# Patient Record
Sex: FEMALE
[De-identification: names, ages, dates, MRNs, and addresses within clinical notes are randomized per-mention and may not be internally consistent; named-entity substitution may affect disease eponyms.]

---

## 2020-01-04 ENCOUNTER — HOSPITAL ENCOUNTER (EMERGENCY)
Dept: HOSPITAL 92 - ERS | Age: 80
Discharge: HOME | End: 2020-01-04
Payer: MEDICARE

## 2020-01-04 DIAGNOSIS — Z79.899: ICD-10-CM

## 2020-01-04 DIAGNOSIS — N39.0: Primary | ICD-10-CM

## 2020-01-04 DIAGNOSIS — R05: ICD-10-CM

## 2020-01-04 PROCEDURE — 87086 URINE CULTURE/COLONY COUNT: CPT

## 2020-01-04 PROCEDURE — 87186 SC STD MICRODIL/AGAR DIL: CPT

## 2020-01-04 PROCEDURE — 81003 URINALYSIS AUTO W/O SCOPE: CPT

## 2020-01-04 PROCEDURE — 87077 CULTURE AEROBIC IDENTIFY: CPT

## 2020-01-04 PROCEDURE — 99284 EMERGENCY DEPT VISIT MOD MDM: CPT

## 2020-01-10 ENCOUNTER — HOSPITAL ENCOUNTER (OUTPATIENT)
Dept: HOSPITAL 92 - BICRAD | Age: 80
Discharge: HOME | End: 2020-01-10
Attending: FAMILY MEDICINE
Payer: MEDICARE

## 2020-01-10 DIAGNOSIS — J40: Primary | ICD-10-CM

## 2020-01-10 DIAGNOSIS — J92.9: ICD-10-CM

## 2020-01-10 PROCEDURE — 71046 X-RAY EXAM CHEST 2 VIEWS: CPT

## 2020-01-10 NOTE — RAD
2 view chest:

[1/10/2020]



Comparison:2/19/2017



HISTORY: History of lung cancer, right-sided pain with bronchitis



FINDINGS: Mass like opacity noted in the right lung apex laterally with adjacent pleural thickening. 
There is elevation of the left hemidiaphragm. No pneumothorax. No lobar consolidation or alveolar

edema. There is atherosclerotic calcification of the aortic arch. Clips in right upper quadrant sugge
st prior cholecystectomy



IMPRESSION: Masslike opacity in the right lung apex with adjacent pleural thickening consistent with 
the provided history of lung cancer. No focal consolidation or alveolar edema.



Reported By: Agustin Poole 

Electronically Signed:  1/10/2020 11:57 AM

## 2020-01-13 ENCOUNTER — HOSPITAL ENCOUNTER (OUTPATIENT)
Dept: HOSPITAL 92 - BICULT | Age: 80
Discharge: HOME | End: 2020-01-13
Attending: FAMILY MEDICINE
Payer: MEDICARE

## 2020-01-13 DIAGNOSIS — N39.0: Primary | ICD-10-CM

## 2020-01-13 PROCEDURE — 76770 US EXAM ABDO BACK WALL COMP: CPT

## 2020-01-13 NOTE — ULT
Renal ultrasound:

1/13/2020



COMPARISON: None



HISTORY: Frequent urinary tract infections



TECHNIQUE: Multiplanar grayscale sonographic imaging of the kidneys and urinary bladder obtained.



FINDINGS: Right kidney measures 8.6 x 4.0 x 4.0 with a cortical thickness of approximately 1 cm. Left
 kidney measures 9.0 x 4.7 x 3.9 cm with a cortical thickness of approximately 1.1 cm. Urinary

bladder is empty and not well assessed.



No renal mass, hydronephrosis, or renal stone noted on either side.



IMPRESSION: Unremarkable renal ultrasound.



Reported By: Agustin Poole 

Electronically Signed:  1/13/2020 3:13 PM

## 2020-12-13 ENCOUNTER — HOSPITAL ENCOUNTER (INPATIENT)
Dept: HOSPITAL 92 - SURG A | Age: 80
LOS: 4 days | Discharge: HOME | DRG: 391 | End: 2020-12-17
Attending: INTERNAL MEDICINE | Admitting: INTERNAL MEDICINE
Payer: MEDICARE

## 2020-12-13 VITALS — BODY MASS INDEX: 25.1 KG/M2

## 2020-12-13 DIAGNOSIS — T45.1X5A: ICD-10-CM

## 2020-12-13 DIAGNOSIS — K57.32: Primary | ICD-10-CM

## 2020-12-13 DIAGNOSIS — C34.90: ICD-10-CM

## 2020-12-13 DIAGNOSIS — D61.810: ICD-10-CM

## 2020-12-13 DIAGNOSIS — Z90.49: ICD-10-CM

## 2020-12-13 DIAGNOSIS — N18.2: ICD-10-CM

## 2020-12-13 PROCEDURE — 80048 BASIC METABOLIC PNL TOTAL CA: CPT

## 2020-12-13 PROCEDURE — 85025 COMPLETE CBC W/AUTO DIFF WBC: CPT

## 2020-12-13 PROCEDURE — 36415 COLL VENOUS BLD VENIPUNCTURE: CPT

## 2020-12-13 NOTE — PDOC.HHP
Hospitalist HPI





- History of Present Illness


Nausea and vomiting


History of Present Illness: 





PCP: Dr. Oseguera





The patient is a 80-year-old female with a past medical history significant for 

lung cancer (on chemotherapy) that presents to the hospital as a direct admit 

from [facility] for the above complaint.  The patient reports that she developed

lower abdominal pain with associated weakness and fatigue 1 week ago.  This past

Monday she went to see her primary care provider who ordered general labs and a 

CT of the abdomen and pelvis.  The following day, she was diagnosed with 

diverticulitis and started on ciprofloxacin and Flagyl oral antibiotics.  After 

starting the antibiotics, the patient developed nausea and vomiting.  She 

reports that she would vomit several times a day after most oral intake.  She 

reports that she did hold down some liquids,however, she was unable to hold down

most solid foods.  She describes the emesis as clear.  She denies any 

hemoptysis.  She denies any hematochezia/melena.  She has no urinary symptoms.  

Denies any fever or chills.  Because of her persistent nausea and vomiting, the 

patient went to a freestanding emergency room for further evaluation.  The 

patient was subsequently transferred to our facility for further medical 

treatment.


ED Course: 





[facility]:


Presented stable vital signs.


EKG normal sinus rhythm.


CT abdomen and pelvis with contrast showed inflammatory changes seen at proximal

sigmoid colon, suggesting acute diverticulitis.


WBC 3.8, hemoglobin 13.4, hematocrit 39.3, platelets 152


Covid negative


UA unremarkable


Sodium 143, potassium 3.5, chloride 103, CO2 27, BUN 11, creatinine 0.8, glucose

103


GGT 11, T bili 0.6, amylase 32, albumin 4.0, alk phos 59, ALT less than 5, AST 

32





Medications:


1 L normal saline


Ciprofloxacin IVPB at 1510


Flagyl IVPB at 1409


Zoan





Hospitalist ROS





- Review of Systems


Constitutional: denies: fever, chills


Respiratory: denies: cough, shortness of breath, hemoptysis


Cardiovascular: denies: chest pain, palpitations, edema, light headedness


Gastrointestinal: reports: nausea, vomiting, abdominal pain (Bilateral lower 

abdomen).  denies: diarrhea, constipation, melena, hematochezia


Genitourinary: denies: dysuria, hematuria


Neurological: reports: weakness (Generalized).  denies: change in speech


All other systems reviewed; all pertinent +/- noted in HPI/Subj





- Medication


Medications: 








Tagrisso 80 mg p.o. daily





Allergies: No known drug allergies





Hospitalist History





- Past Medical History


Source: patient, family, old records


Pulmonary: reports: Other (Lung cancer currently on chemotherapy daily followed 

at [facility])





- Past Surgical History


Past Surgical History: reports: Cholecystectomy, Other (Lung biopsy)





- Family History


Other Family History: 





Noncontributory to this case





- Social History


Smoking Status: Never smoker


Alcohol: reports: None


Drugs: reports: none


Living Situation: With Family


Occupation: Does not work


Activity level: independent ambulation





- Exam


General Appearance: NAD, awake alert.  negative: ill appearing


Eye: anicteric sclera


ENT: normocephalic atraumatic, dry oral mucosa


Neck: supple, symmetric


Heart: RRR, no gallops, no rubs, normal peripheral pulses, II/IV


Respiratory: CTAB, no wheezes, no rales, no tachypnea


Gastrointestinal: soft, non-distended, normal bowel sounds, no guarding, no 

rigidity, tender to palpation (Of lower quadrant)


Gastrointestinal - other findings: Mild rebound tenderness


Extremities: no cyanosis, no edema


Skin: no rashes


Neurological: no focal deficits


Musculoskeletal: normal tone, normal strength


Psychiatric: normal affect, A&O x 3


Psychiatric - other findings: Greenlandic speaking





Hospitalist Results





- EKG Interpretation


EKG: 





Normal sinus rhythm, no ST elevations





- Radiology Interpretation


  ** CT scan - abdomen


Status: report reviewed by me


Additional Comment: 





Inflammatory change seen at proximal sigmoid colon suggesting acute 

diverticulitis.





Hospitalist H&P A/P





- Problem


(1) Sigmoid diverticulitis


Code(s): K57.32 - DVTRCLI OF LG INT W/O PERFORATION OR ABSCESS W/O BLEEDING   

Status: Acute   





(2) Nausea and vomiting


Code(s): R11.2 - NAUSEA WITH VOMITING, UNSPECIFIED   Status: Acute   





(3) Adenocarcinoma


Code(s): C80.1 - MALIGNANT (PRIMARY) NEOPLASM, UNSPECIFIED   Status: Chronic   





- Plan


Plan: 





80/F with PMH lung cancer presents for diverticulitis.


Admit to surgical floor, inpatient status.  Expected length of stay greater than

2 midnights.





Received as transfer from [facility].


Vital signs stable


EKG NSR


CT abdomen pelvis acute diverticulitis of the sigmoid colon.


WBC 3.8





#Sigmoid diverticulitis


Patient unable to tolerate p.o. antibiotics outpatient.


Patient currently immunocompromised on daily chemotherapy for adenocarcinoma 

lungs.


Start Zosyn IVPB.


Continue IV fluids.


Clear liquid diet.





#Nausea and vomiting


Multifactorial.


Likely related to oral antibiotic regimen and problem #1.


Antiemetics and analgesia as needed.





#Adenocarcinoma lung


Diagnosed lung cancer February 2016.


Followed at [facility].


Currently on daily chemotherapy medication.


We will hold chemotherapy for now.





Lovenox for DVT prophylaxis.


Pepcid for GI prophylaxis.


Full code.


Discussed case Dr. Debby Holder.

## 2020-12-14 LAB
ANION GAP SERPL CALC-SCNC: 13 MMOL/L (ref 10–20)
BASOPHILS # BLD AUTO: 0 THOU/UL (ref 0–0.2)
BASOPHILS NFR BLD AUTO: 0 % (ref 0–1)
BUN SERPL-MCNC: 9 MG/DL (ref 9.8–20.1)
CALCIUM SERPL-MCNC: 8.3 MG/DL (ref 7.8–10.44)
CHLORIDE SERPL-SCNC: 106 MMOL/L (ref 98–107)
CO2 SERPL-SCNC: 26 MMOL/L (ref 23–31)
CREAT CL PREDICTED SERPL C-G-VRATE: 41 ML/MIN (ref 70–130)
EOSINOPHIL # BLD AUTO: 0.1 THOU/UL (ref 0–0.7)
EOSINOPHIL NFR BLD AUTO: 1.8 % (ref 0–10)
GLUCOSE SERPL-MCNC: 77 MG/DL (ref 83–110)
HGB BLD-MCNC: 11.1 G/DL (ref 12–16)
LYMPHOCYTES # BLD: 0.4 THOU/UL (ref 1.2–3.4)
LYMPHOCYTES NFR BLD AUTO: 12.3 % (ref 21–51)
MCH RBC QN AUTO: 31.6 PG (ref 27–31)
MCV RBC AUTO: 97.1 FL (ref 78–98)
MONOCYTES # BLD AUTO: 0.4 THOU/UL (ref 0.11–0.59)
MONOCYTES NFR BLD AUTO: 10.4 % (ref 0–10)
NEUTROPHILS # BLD AUTO: 2.6 THOU/UL (ref 1.4–6.5)
NEUTROPHILS NFR BLD AUTO: 75.4 % (ref 42–75)
PLATELET # BLD AUTO: 108 THOU/UL (ref 130–400)
POTASSIUM SERPL-SCNC: 4 MMOL/L (ref 3.5–5.1)
RBC # BLD AUTO: 3.52 MILL/UL (ref 4.2–5.4)
SODIUM SERPL-SCNC: 141 MMOL/L (ref 136–145)
WBC # BLD AUTO: 3.4 THOU/UL (ref 4.8–10.8)

## 2020-12-14 RX ADMIN — FAMOTIDINE SCH: 10 INJECTION, SOLUTION INTRAVENOUS at 08:28

## 2020-12-14 NOTE — PDOC.HOSPP
- Subjective


Encounter Date: 12/14/20


Subjective: 


Feeling a little better.  Nausea seems to be getting somewhat better.  She was 

able to take a little bit in the way of clear liquids.  Has not had the oral 

antibiotics since Saturday.





- Objective


Vital Signs & Weight: 


                             Vital Signs (12 hours)











  Temp Pulse Resp BP Pulse Ox


 


 12/14/20 15:05  97.6 F  67  14  99/68  97


 


 12/14/20 10:50  98.5 F  72  14  96/61  97


 


 12/14/20 07:25  98.5 F  66  16  112/69  97








                                     Weight











Weight                         112 lb














I&O: 


                                        











 12/13/20 12/14/20 12/15/20





 06:59 06:59 06:59


 


Intake Total  1100 


 


Balance  1100 











Result Diagrams: 


                                 12/14/20 04:59





                                 12/14/20 04:59





Hospitalist ROS





- Medication


Medications: 


Active Medications











Generic Name Dose Route Start Last Admin





  Trade Name Freq  PRN Reason Stop Dose Admin


 


Enoxaparin Sodium  40 mg  12/14/20 09:00  12/14/20 08:13





  Enoxaparin Sodium 40 Mg/0.4 Ml Syringe  SC   40 mg





  0900 LAZARA   Administration


 


Famotidine  20 mg  12/14/20 09:00  12/14/20 08:28





  Famotidine/Pf 20 Mg/2ml Vial  SLOW IVP   Not Given





  QAM LAZARA  


 


Famotidine  20 mg  12/14/20 09:00  12/14/20 08:13





  Famotidine 20 Mg Tab  PO   20 mg





  QAM LAZARA   Administration


 


Piperacillin Sod/Tazobactam  100 mls @ 200 mls/hr  12/14/20 06:00  12/14/20 

13:46





  Sod 4.5 gm/ Sodium Chloride  IVPB   100 mls





  Q8HR LAZARA   Administration


 


Sodium Chloride  1,000 mls @ 90 mls/hr  12/13/20 22:30  12/14/20 05:00





  Normal Saline 0.9%  IV   1,000 mls





  .Q11H7M LAZARA   Administration














- Exam


General Appearance: NAD, awake alert


Heart: RRR, no gallops, no rubs, II/IV


Respiratory: CTAB, no wheezes, no rales, no ronchi, normal chest expansion, no 

tachypnea, normal percussion


Gastrointestinal: soft, non-distended, normal bowel sounds, no palpable masses, 

no hepatomegaly, no splenomegaly, tender to palpation (Left mid abdomen and 

lower quadrant)


Extremities: no cyanosis, no clubbing, no edema


Skin: normal turgor


Musculoskeletal: normal tone, normal strength, no muscle wasting


Psychiatric: normal affect, normal behavior, A&O x 3





Hosp A/P


(1) Sigmoid diverticulitis


Code(s): K57.32 - DVTRCLI OF LG INT W/O PERFORATION OR ABSCESS W/O BLEEDING   

Status: Acute   





(2) Nausea and vomiting


Code(s): R11.2 - NAUSEA WITH VOMITING, UNSPECIFIED   Status: Acute   





(3) Lung cancer


Code(s): C34.90 - MALIGNANT NEOPLASM OF UNSP PART OF UNSP BRONCHUS OR LUNG   

Status: Acute   





(4) Pancytopenia


Code(s): D61.818 - OTHER PANCYTOPENIA   Status: Acute   





- Plan





80-year-old female who initially presented as an outpatient with abdominal pain.

 She had a CT scan obtained which revealed left-sided diverticulitis at the 

junction of the descending colon and sigmoid.  She was started on p.o. Cipro and

Flagyl, however, she subsequently developed significant nausea and vomiting.





Diverticulitis:


Continue with IV Zosyn.





Nausea vomiting:


Continue with as needed IV antiemetics


IV fluids.





Pancytopenia:


Likely related to her underlying lung cancer


May cause some mild immunosuppression





Lung cancer:


Stable.  Followed at [facility].

## 2020-12-15 LAB
BASOPHILS # BLD AUTO: 0 THOU/UL (ref 0–0.2)
BASOPHILS NFR BLD AUTO: 0.5 % (ref 0–1)
EOSINOPHIL # BLD AUTO: 0.1 THOU/UL (ref 0–0.7)
EOSINOPHIL NFR BLD AUTO: 1.6 % (ref 0–10)
HGB BLD-MCNC: 11 G/DL (ref 12–16)
LYMPHOCYTES # BLD: 0.5 THOU/UL (ref 1.2–3.4)
LYMPHOCYTES NFR BLD AUTO: 14.1 % (ref 21–51)
MCH RBC QN AUTO: 31.3 PG (ref 27–31)
MCV RBC AUTO: 98.2 FL (ref 78–98)
MONOCYTES # BLD AUTO: 0.2 THOU/UL (ref 0.11–0.59)
MONOCYTES NFR BLD AUTO: 6.8 % (ref 0–10)
NEUTROPHILS # BLD AUTO: 2.6 THOU/UL (ref 1.4–6.5)
NEUTROPHILS NFR BLD AUTO: 77 % (ref 42–75)
PLATELET # BLD AUTO: 109 THOU/UL (ref 130–400)
RBC # BLD AUTO: 3.53 MILL/UL (ref 4.2–5.4)
WBC # BLD AUTO: 3.4 THOU/UL (ref 4.8–10.8)

## 2020-12-15 RX ADMIN — FAMOTIDINE SCH: 10 INJECTION, SOLUTION INTRAVENOUS at 09:53

## 2020-12-15 NOTE — PDOC.HOSPP
- Subjective


Encounter Date: 12/15/20


Subjective: 


Patient is feeling somewhat better today.  She has been taking a few p.o. 

liquids and.  No nausea since around midnight.  Abdominal pain seems to be 

better.





- Objective


Vital Signs & Weight: 


                             Vital Signs (12 hours)











  Temp Pulse Resp BP BP Pulse Ox


 


 12/15/20 15:21  97.9 F  62  12  120/70   98


 


 12/15/20 11:30  97.5 F L  62  14   106/63  97


 


 12/15/20 07:21  97.6 F  63  12  120/68   97








                                     Weight











Weight                         112 lb














I&O: 


                                        











 12/14/20 12/15/20 12/16/20





 06:59 06:59 06:59


 


Intake Total 1100  


 


Balance 1100  











Result Diagrams: 


                                 12/15/20 09:00





                                 12/14/20 04:59





Hospitalist ROS





- Medication


Medications: 


Active Medications











Generic Name Dose Route Start Last Admin





  Trade Name Freq  PRN Reason Stop Dose Admin


 


Enoxaparin Sodium  40 mg  12/14/20 09:00  12/15/20 08:08





  Enoxaparin Sodium 40 Mg/0.4 Ml Syringe  SC   40 mg





  0900 LAZARA   Administration


 


Famotidine  20 mg  12/14/20 09:00  12/15/20 09:53





  Famotidine/Pf 20 Mg/2ml Vial  SLOW IVP   Not Given





  QAM LAZARA  


 


Famotidine  20 mg  12/14/20 09:00  12/15/20 08:08





  Famotidine 20 Mg Tab  PO   20 mg





  QAM LAZARA   Administration


 


Piperacillin Sod/Tazobactam  100 mls @ 200 mls/hr  12/14/20 06:00  12/15/20 

15:51





  Sod 4.5 gm/ Sodium Chloride  IVPB   100 mls





  Q8HR LAZARA   Administration


 


Sodium Chloride  1,000 mls @ 90 mls/hr  12/13/20 22:30  12/15/20 05:08





  Normal Saline 0.9%  IV   1,000 mls





  .Q11H7M LAZARA   Administration


 


Ondansetron HCl  4 mg  12/13/20 22:28  12/14/20 22:24





  Ondansetron Pf 4 Mg/2 Ml Vial  IVP   4 mg





  Q6H PRN   Administration





  Nausea/Vomiting  


 


Sodium Chloride  10 ml  12/13/20 22:28  12/14/20 22:25





  Flush - Normal Saline 10 Ml Syringe  IVF   10 ml





  PRN PRN   Administration





  Saline Flush  














- Exam


General Appearance: NAD, awake alert


Heart: RRR, no murmur, no gallops, no rubs, normal peripheral pulses


Respiratory: CTAB, no wheezes, no rales, no ronchi, normal chest expansion, no 

tachypnea, normal percussion


Gastrointestinal: soft, non-tender, non-distended, normal bowel sounds, no 

palpable masses, no hepatomegaly, no splenomegaly, no bruit


Extremities: no cyanosis, no clubbing, no edema


Skin: normal turgor, no lesions, no rashes


Neurological: cranial nerve grossly intact, normal sensation to touch, no 

weakness, no focal deficits, no new deficit


Musculoskeletal: normal tone, generalized weakness


Psychiatric: normal affect, normal behavior, A&O x 3





Hosp A/P


(1) Sigmoid diverticulitis


Code(s): K57.32 - DVTRCLI OF LG INT W/O PERFORATION OR ABSCESS W/O BLEEDING   

Status: Acute   





(2) Nausea and vomiting


Code(s): R11.2 - NAUSEA WITH VOMITING, UNSPECIFIED   Status: Acute   





(3) Lung cancer


Code(s): C34.90 - MALIGNANT NEOPLASM OF UNSP PART OF UNSP BRONCHUS OR LUNG   

Status: Acute   





(4) Pancytopenia


Code(s): D61.818 - OTHER PANCYTOPENIA   Status: Acute   





- Plan





80-year-old female who initially presented as an outpatient with abdominal pain.

 She had a CT scan obtained which revealed left-sided diverticulitis at the 

junction of the descending colon and sigmoid.  She was started on p.o. Cipro and

Flagyl, however, she subsequently developed significant nausea and vomiting.





Diverticulitis:


Pain appears to be resolved on 12/15/2020.  


Continue with IV Zosyn.


Unfortunately this patient seems to have nausea vomiting with even low doses of 

most antibiotics taken orally. 


Will likely need to keep her a couple more days on IV antibiotics.





Nausea vomiting:


Continue with as needed IV antiemetics


IV fluids.





Pancytopenia:


Likely related to her underlying lung cancer


May cause some mild immunosuppression





Lung cancer:


Stable.  Followed at [facility].

## 2020-12-16 RX ADMIN — FAMOTIDINE SCH: 10 INJECTION, SOLUTION INTRAVENOUS at 09:44

## 2020-12-16 NOTE — PDOC.HOSPP
- Subjective


Encounter Date: 12/16/20


Encounter Time: 17:00


Subjective: 


Patient seen and examined for acute diverticulitis.  Abdominal pain improving.  

Denies any nausea or vomiting.  Had 3 loose bowel movements earlier





- Objective


Vital Signs & Weight: 


                             Vital Signs (12 hours)











  Temp Pulse Resp BP BP Pulse Ox


 


 12/16/20 20:30       98


 


 12/16/20 19:20  97.8 F  63  16   151/70 H  98


 


 12/16/20 16:12  97.7 F  62  18  130/74   98


 


 12/16/20 11:47  97.9 F  60  16   115/65  97








                                     Weight











Weight                         112 lb














I&O: 


                                        











 12/15/20 12/16/20 12/17/20





 06:59 06:59 06:59


 


Intake Total   2040


 


Balance   2040











Result Diagrams: 


                                 12/15/20 09:00





                                 12/14/20 04:59


Radiology Reviewed by me: Yes (CT abdomen reviewed)





Hospitalist ROS





- Review of Systems


Respiratory: denies: cough, dry, shortness of breath, hemoptysis, SOB with 

excertion, pleuritic pain, sputum, wheezing, other


Cardiovascular: denies: chest pain, palpitations, orthopnea, paroxysmal noc. 

dyspnea, edema, light headedness, other





- Medication


Medications: 


Active Medications











Generic Name Dose Route Start Last Admin





  Trade Name Freq  PRN Reason Stop Dose Admin


 


Enoxaparin Sodium  40 mg  12/14/20 09:00  12/16/20 09:42





  Enoxaparin Sodium 40 Mg/0.4 Ml Syringe  SC   40 mg





  0900 LAZARA   Administration


 


Famotidine  20 mg  12/14/20 09:00  12/16/20 09:42





  Famotidine 20 Mg Tab  PO   20 mg





  QAM LAZARA   Administration


 


Piperacillin Sod/Tazobactam  100 mls @ 200 mls/hr  12/14/20 06:00  12/16/20 

21:32





  Sod 4.5 gm/ Sodium Chloride  IVPB  12/16/20 23:59  100 mls





  Q8HR LAZARA   Administration


 


Sodium Chloride  1,000 mls @ 90 mls/hr  12/13/20 22:30  12/16/20 19:39





  Normal Saline 0.9%  IV  12/16/20 23:59  Not Given





  .Q11H7M LAZARA  


 


Ondansetron HCl  4 mg  12/13/20 22:28  12/14/20 22:24





  Ondansetron Pf 4 Mg/2 Ml Vial  IVP   4 mg





  Q6H PRN   Administration





  Nausea/Vomiting  


 


Sodium Chloride  10 ml  12/13/20 22:28  12/14/20 22:25





  Flush - Normal Saline 10 Ml Syringe  IVF   10 ml





  PRN PRN   Administration





  Saline Flush  














- Exam


General Appearance: NAD


Neck: supple, no JVD


Heart: RRR, no gallops


Respiratory: no wheezes, no ronchi


Gastrointestinal: soft, non-tender


Extremities: no cyanosis





Hosp A/P





- Plan


DVT proph w/SCDs





Acute uncomplicated diverticulitis


Nausea vomiting due to above


History of lung cancer


Chronic pancytopenia


CKD stage II





Plan:


Change IV Zosyn to Augmentin.  Reduce IV fluid to KVO.  Fiber restricted diet.  

Add probiotics.  DC in 24 hours if tolerating p.o.

## 2020-12-17 VITALS — TEMPERATURE: 98.7 F | SYSTOLIC BLOOD PRESSURE: 118 MMHG | DIASTOLIC BLOOD PRESSURE: 72 MMHG

## 2020-12-17 NOTE — PDOC.DS.DS
Provider





- Provider


Date of Admission: 


12/13/20 20:59





Date of Discharge: 12/17/20


Admitting Provider: 


Debby Grubbs MD





Consultations: None


Primary Care Physician: 


Angelo Walden PA-C








Course





- Hospital Course


Hospital Course: 


Patient is a 80-year-old female with lung cancer on chemotherapy presented as a 

direct admit from [place] emergency room with nausea and vomiting.  Patient was 

recently diagnosed with diverticulitis on an outpatient CT scan and was started 

on Cipro and Flagyl.  However she was unable to tolerated the above antibiotics 

due to persistent nausea and vomiting.  Please refer to the history and physical

for further details.





Patient was admitted to the medical floor with a diagnosis of sigmoid 

diverticulitis.  She was kept n.p.o. and was started on IV fluids.  Nausea 

vomiting was controlled with Zofran.  Later on she was started on a clear liquid

diet that has been advanced to low fiber.  She was advised to change to high-

fiber diet in 1 to 2 weeks.  She was also counseled by dietitian on dietary 

modification for diverticulosis.  She denies any nausea or vomiting on the day 

of discharge.





Final diagnosis:


Acute uncomplicated diverticulitis


Nausea vomiting due to above


History of lung cancer


Chronic pancytopenia


CKD stage II





Resuscitation Status: 








12/13/20 22:28


Resuscitation Status Routine 


   Co-Sign Provider: 


   Resuscitation Status: FULL: Full Resuscitation


   Discussed with: patient











- Labs


Lab Results: 


                                        





                                 12/15/20 09:00 





                                 12/14/20 04:59 











- Physical Exam


Vitals: 


                             Vital Signs (12 hours)











  Temp Pulse Resp BP Pulse Ox


 


 12/17/20 07:10  98.1 F  64  18  118/68  97


 


 12/17/20 03:19  98.4 F  67  16  123/69  97


 


 12/16/20 23:41  98.8 F  65  16  122/66  97








                                     Weight











Weight                         112 lb














Physical Exam: 


The patient was seen and examined on the day of discharge.








Plan





- Discharge Medications


Prescriptions: 


Amoxicillin/Potassium Clav [Augmentin] 875 mg PO Q12HR #5 tab


Saccharomyces boulardii [Florastor] 250 mg PO DAILY #30 cap


Famotidine [Pepcid] 20 mg PO BID #60 tab


Ondansetron [Zofran ODT] 4 mg PO Q6H PRN #20 tab


 PRN Reason: Nausea/Vomiting


Home Medications: 


                                        











 Medication  Instructions  Recorded  Confirmed  Type


 


Osimertinib Mesylate [Tagrisso] 80 mg PO DAILY 12/13/20 12/13/20 History


 


Amoxicillin/Potassium Clav 875 mg PO Q12HR #5 tab 12/17/20  Rx





[Augmentin]    


 


Famotidine [Pepcid] 20 mg PO BID #60 tab 12/17/20  Rx


 


Ondansetron [Zofran ODT] 4 mg PO Q6H PRN #20 tab 12/17/20  Rx


 


Saccharomyces boulardii [Florastor] 250 mg PO DAILY #30 cap 12/17/20  Rx











Allergies: 








No Known Drug Allergies Allergy (Verified 03/23/20 10:18)


   








- Discharge Instructions


Discharge Instructions:: 


Colonoscopy as outpatient





- Follow up Plan


Referrals: 


Jose L Cruz MD [Active] - 


Angelo Walden PA-C [Primary Care Provider] - 7 Days


Disposition: HOME





Quality





- Care Measures


CORE MEASURES:: N/A

## 2020-12-19 NOTE — PQF
CLINICAL DOCUMENTATION CLARIFICATION FORM:



Dear :Chris España   Date / Time: 12/19/2020 05:52

Please exercise your independent, professional judgment in responding to the 
clarification form. 

Clinical indicators are provided on the bottom of this form for your review







Please check appropriate box(es):

[  ] Pancytopenia 

[ x ] Pancytopenia due to Chemotherapy/antineoplastic drugs

[  ] Pancytopenia due to other drug (please specify if known): 
_______________________

[  ] Pancytopenia due to exposure to toxins/pollutants (please specify substance
if known): _________________________

[  ] Other diagnosis,please specify ___________

[  ] Unable to determine



Physician Signature:                         Date/Time:



For continuity of documentation, please document condition throughout progress 
notes and discharge summary.  Thank You.





To be completed by CDI/Coding staff for physician review: 







 Present Clinical Indicators - Signs / Symptoms / Labs Results and Location in 

Medical Record

 

[x] Chronic pancytopenia DS 12/17

 

[x] Currently on daily chemotherapy medication HP 12/13

 

[x] Pancytopenia likely related to her underlying lung cancer PN 12/15

 

[x] RBC=3.52 Hgb=11.1 Hct=34.2 Soo=160 Laboratory 12/14

 

Present Risk Factors                                                            
              Results and Location in Medical Record

 

[x] 80 years old female HP 12/13

 

[x] Lung cancer HP 12/13

 

[x] On chemotherapy HP 12/13

 

Present Treatments                                                              
                Results and Location in Medical Record

 

[x] Hematology Monitoring Laboratory 12/14

 

[x] IVF MAR 12/13





                 This is a permanent part of the Medical Record

[facility]

## 2021-04-11 ENCOUNTER — HOSPITAL ENCOUNTER (INPATIENT)
Dept: HOSPITAL 92 - ERS | Age: 81
LOS: 9 days | Discharge: HOME | DRG: 391 | End: 2021-04-20
Attending: HOSPITALIST | Admitting: FAMILY MEDICINE
Payer: MEDICARE

## 2021-04-11 VITALS — BODY MASS INDEX: 22.5 KG/M2

## 2021-04-11 DIAGNOSIS — D61.810: ICD-10-CM

## 2021-04-11 DIAGNOSIS — Z98.1: ICD-10-CM

## 2021-04-11 DIAGNOSIS — E87.6: ICD-10-CM

## 2021-04-11 DIAGNOSIS — R13.19: ICD-10-CM

## 2021-04-11 DIAGNOSIS — T45.1X5A: ICD-10-CM

## 2021-04-11 DIAGNOSIS — E44.0: ICD-10-CM

## 2021-04-11 DIAGNOSIS — R11.0: ICD-10-CM

## 2021-04-11 DIAGNOSIS — Z90.49: ICD-10-CM

## 2021-04-11 DIAGNOSIS — K57.32: Primary | ICD-10-CM

## 2021-04-11 DIAGNOSIS — Z79.899: ICD-10-CM

## 2021-04-11 DIAGNOSIS — E87.1: ICD-10-CM

## 2021-04-11 DIAGNOSIS — C34.90: ICD-10-CM

## 2021-04-11 DIAGNOSIS — K52.1: ICD-10-CM

## 2021-04-11 DIAGNOSIS — Z66: ICD-10-CM

## 2021-04-11 DIAGNOSIS — Z20.822: ICD-10-CM

## 2021-04-11 LAB
ALBUMIN SERPL BCG-MCNC: 3.4 G/DL (ref 3.4–4.8)
ALP SERPL-CCNC: 42 U/L (ref 40–110)
ALT SERPL W P-5'-P-CCNC: 9 U/L (ref 8–55)
ANION GAP SERPL CALC-SCNC: 13 MMOL/L (ref 10–20)
AST SERPL-CCNC: 24 U/L (ref 5–34)
BILIRUB SERPL-MCNC: 0.7 MG/DL (ref 0.2–1.2)
BUN SERPL-MCNC: 19 MG/DL (ref 9.8–20.1)
CALCIUM SERPL-MCNC: 8.2 MG/DL (ref 7.8–10.44)
CHLORIDE SERPL-SCNC: 104 MMOL/L (ref 98–107)
CO2 SERPL-SCNC: 22 MMOL/L (ref 23–31)
CREAT CL PREDICTED SERPL C-G-VRATE: 0 ML/MIN (ref 70–130)
GLOBULIN SER CALC-MCNC: 2.4 G/DL (ref 2.4–3.5)
GLUCOSE SERPL-MCNC: 114 MG/DL (ref 83–110)
HGB BLD-MCNC: 10.5 G/DL (ref 12–16)
LIPASE SERPL-CCNC: 28 U/L (ref 8–78)
MCH RBC QN AUTO: 33.2 PG (ref 27–31)
MCV RBC AUTO: 98.1 FL (ref 78–98)
PLATELET # BLD AUTO: 53 THOU/UL (ref 130–400)
POTASSIUM SERPL-SCNC: 3.6 MMOL/L (ref 3.5–5.1)
PROT UR STRIP.AUTO-MCNC: 20 MG/DL
RBC # BLD AUTO: 3.16 MILL/UL (ref 4.2–5.4)
RBC UR QL AUTO: (no result) HPF (ref 0–3)
REFLEX FOR REVIEW??: YES
SODIUM SERPL-SCNC: 135 MMOL/L (ref 136–145)
SP GR UR STRIP: 1.02 (ref 1–1.04)
WBC # BLD AUTO: 0.4 THOU/UL (ref 4.8–10.8)
WBC UR QL AUTO: (no result) HPF (ref 0–3)

## 2021-04-11 PROCEDURE — 87040 BLOOD CULTURE FOR BACTERIA: CPT

## 2021-04-11 PROCEDURE — 80048 BASIC METABOLIC PNL TOTAL CA: CPT

## 2021-04-11 PROCEDURE — 96376 TX/PRO/DX INJ SAME DRUG ADON: CPT

## 2021-04-11 PROCEDURE — 86900 BLOOD TYPING SEROLOGIC ABO: CPT

## 2021-04-11 PROCEDURE — 87449 NOS EACH ORGANISM AG IA: CPT

## 2021-04-11 PROCEDURE — 85025 COMPLETE CBC W/AUTO DIFF WBC: CPT

## 2021-04-11 PROCEDURE — 87324 CLOSTRIDIUM AG IA: CPT

## 2021-04-11 PROCEDURE — 83630 LACTOFERRIN FECAL (QUAL): CPT

## 2021-04-11 PROCEDURE — 86901 BLOOD TYPING SEROLOGIC RH(D): CPT

## 2021-04-11 PROCEDURE — 36415 COLL VENOUS BLD VENIPUNCTURE: CPT

## 2021-04-11 PROCEDURE — 83735 ASSAY OF MAGNESIUM: CPT

## 2021-04-11 PROCEDURE — 87427 SHIGA-LIKE TOXIN AG IA: CPT

## 2021-04-11 PROCEDURE — 85060 BLOOD SMEAR INTERPRETATION: CPT

## 2021-04-11 PROCEDURE — 96365 THER/PROPH/DIAG IV INF INIT: CPT

## 2021-04-11 PROCEDURE — 96366 THER/PROPH/DIAG IV INF ADDON: CPT

## 2021-04-11 PROCEDURE — 83690 ASSAY OF LIPASE: CPT

## 2021-04-11 PROCEDURE — U0005 INFEC AGEN DETEC AMPLI PROBE: HCPCS

## 2021-04-11 PROCEDURE — U0003 INFECTIOUS AGENT DETECTION BY NUCLEIC ACID (DNA OR RNA); SEVERE ACUTE RESPIRATORY SYNDROME CORONAVIRUS 2 (SARS-COV-2) (CORONAVIRUS DISEASE [COVID-19]), AMPLIFIED PROBE TECHNIQUE, MAKING USE OF HIGH THROUGHPUT TECHNOLOGIES AS DESCRIBED BY CMS-2020-01-R: HCPCS

## 2021-04-11 PROCEDURE — 81003 URINALYSIS AUTO W/O SCOPE: CPT

## 2021-04-11 PROCEDURE — 8E0ZXY6 ISOLATION: ICD-10-PCS | Performed by: FAMILY MEDICINE

## 2021-04-11 PROCEDURE — 74019 RADEX ABDOMEN 2 VIEWS: CPT

## 2021-04-11 PROCEDURE — 81015 MICROSCOPIC EXAM OF URINE: CPT

## 2021-04-11 PROCEDURE — 87046 STOOL CULTR AEROBIC BACT EA: CPT

## 2021-04-11 PROCEDURE — 82274 ASSAY TEST FOR BLOOD FECAL: CPT

## 2021-04-11 PROCEDURE — 87635 SARS-COV-2 COVID-19 AMP PRB: CPT

## 2021-04-11 PROCEDURE — 96375 TX/PRO/DX INJ NEW DRUG ADDON: CPT

## 2021-04-11 PROCEDURE — 80053 COMPREHEN METABOLIC PANEL: CPT

## 2021-04-11 PROCEDURE — G0378 HOSPITAL OBSERVATION PER HR: HCPCS

## 2021-04-11 PROCEDURE — P9036 PLATELET PHERESIS IRRADIATED: HCPCS

## 2021-04-11 PROCEDURE — 36430 TRANSFUSION BLD/BLD COMPNT: CPT

## 2021-04-11 PROCEDURE — 87045 FECES CULTURE AEROBIC BACT: CPT

## 2021-04-11 PROCEDURE — 86850 RBC ANTIBODY SCREEN: CPT

## 2021-04-11 PROCEDURE — C9113 INJ PANTOPRAZOLE SODIUM, VIA: HCPCS

## 2021-04-11 RX ADMIN — ONDANSETRON PRN MG: 2 INJECTION INTRAMUSCULAR; INTRAVENOUS at 22:22

## 2021-04-12 LAB
ANION GAP SERPL CALC-SCNC: 12 MMOL/L (ref 10–20)
BUN SERPL-MCNC: 11 MG/DL (ref 9.8–20.1)
CALCIUM SERPL-MCNC: 8 MG/DL (ref 7.8–10.44)
CHLORIDE SERPL-SCNC: 106 MMOL/L (ref 98–107)
CO2 SERPL-SCNC: 24 MMOL/L (ref 23–31)
CREAT CL PREDICTED SERPL C-G-VRATE: 57 ML/MIN (ref 70–130)
GLUCOSE SERPL-MCNC: 132 MG/DL (ref 83–110)
HGB BLD-MCNC: 10.1 G/DL (ref 12–16)
MCH RBC QN AUTO: 32.2 PG (ref 27–31)
MCV RBC AUTO: 97.7 FL (ref 78–98)
MDIFF COMPLETE?: YES
PLATELET # BLD AUTO: 51 THOU/UL (ref 130–400)
POLYCHROMASIA BLD QL SMEAR: (no result) (100X)
POTASSIUM SERPL-SCNC: 3.1 MMOL/L (ref 3.5–5.1)
RBC # BLD AUTO: 3.14 MILL/UL (ref 4.2–5.4)
SODIUM SERPL-SCNC: 139 MMOL/L (ref 136–145)
WBC # BLD AUTO: 1 THOU/UL (ref 4.8–10.8)

## 2021-04-12 RX ADMIN — ONDANSETRON PRN MG: 2 INJECTION INTRAMUSCULAR; INTRAVENOUS at 07:14

## 2021-04-12 RX ADMIN — METRONIDAZOLE SCH MLS: 500 INJECTION, SOLUTION INTRAVENOUS at 15:05

## 2021-04-12 RX ADMIN — METRONIDAZOLE SCH MLS: 500 INJECTION, SOLUTION INTRAVENOUS at 21:20

## 2021-04-13 LAB
ANION GAP SERPL CALC-SCNC: 12 MMOL/L (ref 10–20)
BUN SERPL-MCNC: 9 MG/DL (ref 9.8–20.1)
CALCIUM SERPL-MCNC: 8 MG/DL (ref 7.8–10.44)
CHLORIDE SERPL-SCNC: 106 MMOL/L (ref 98–107)
CO2 SERPL-SCNC: 29 MMOL/L (ref 23–31)
CREAT CL PREDICTED SERPL C-G-VRATE: 60 ML/MIN (ref 70–130)
GLUCOSE SERPL-MCNC: 114 MG/DL (ref 83–110)
HGB BLD-MCNC: 9.8 G/DL (ref 12–16)
MCH RBC QN AUTO: 32.2 PG (ref 27–31)
MCV RBC AUTO: 97.3 FL (ref 78–98)
MDIFF COMPLETE?: YES
PLATELET # BLD AUTO: 45 THOU/UL (ref 130–400)
POLYCHROMASIA BLD QL SMEAR: (no result) (100X)
POTASSIUM SERPL-SCNC: 2.6 MMOL/L (ref 3.5–5.1)
RBC # BLD AUTO: 3.04 MILL/UL (ref 4.2–5.4)
SODIUM SERPL-SCNC: 144 MMOL/L (ref 136–145)
WBC # BLD AUTO: 1.4 THOU/UL (ref 4.8–10.8)

## 2021-04-13 RX ADMIN — METRONIDAZOLE SCH MLS: 500 INJECTION, SOLUTION INTRAVENOUS at 15:30

## 2021-04-13 RX ADMIN — METRONIDAZOLE SCH MLS: 500 INJECTION, SOLUTION INTRAVENOUS at 21:31

## 2021-04-13 RX ADMIN — METRONIDAZOLE SCH MLS: 500 INJECTION, SOLUTION INTRAVENOUS at 05:46

## 2021-04-14 LAB
ANION GAP SERPL CALC-SCNC: 12 MMOL/L (ref 10–20)
BASOPHILS # BLD AUTO: 0 THOU/UL (ref 0–0.2)
BASOPHILS NFR BLD AUTO: 0.4 % (ref 0–1)
BUN SERPL-MCNC: 9 MG/DL (ref 9.8–20.1)
CALCIUM SERPL-MCNC: 7.7 MG/DL (ref 7.8–10.44)
CHLORIDE SERPL-SCNC: 106 MMOL/L (ref 98–107)
CO2 SERPL-SCNC: 24 MMOL/L (ref 23–31)
CREAT CL PREDICTED SERPL C-G-VRATE: 64 ML/MIN (ref 70–130)
EOSINOPHIL # BLD AUTO: 0 THOU/UL (ref 0–0.7)
EOSINOPHIL NFR BLD AUTO: 0 % (ref 0–10)
GLUCOSE SERPL-MCNC: 108 MG/DL (ref 83–110)
HGB BLD-MCNC: 10.4 G/DL (ref 12–16)
LYMPHOCYTES # BLD: 0.3 THOU/UL (ref 1.2–3.4)
LYMPHOCYTES NFR BLD AUTO: 13.5 % (ref 21–51)
MCH RBC QN AUTO: 32.7 PG (ref 27–31)
MCV RBC AUTO: 96.6 FL (ref 78–98)
MONOCYTES # BLD AUTO: 0.2 THOU/UL (ref 0.11–0.59)
MONOCYTES NFR BLD AUTO: 7.7 % (ref 0–10)
NEUTROPHILS # BLD AUTO: 1.6 THOU/UL (ref 1.4–6.5)
NEUTROPHILS NFR BLD AUTO: 78.4 % (ref 42–75)
PLATELET # BLD AUTO: 39 THOU/UL (ref 130–400)
POTASSIUM SERPL-SCNC: 2.5 MMOL/L (ref 3.5–5.1)
RBC # BLD AUTO: 3.18 MILL/UL (ref 4.2–5.4)
SODIUM SERPL-SCNC: 139 MMOL/L (ref 136–145)
WBC # BLD AUTO: 2 THOU/UL (ref 4.8–10.8)

## 2021-04-14 RX ADMIN — POTASSIUM CHLORIDE SCH MLS: 2 INJECTION, SOLUTION, CONCENTRATE INTRAVENOUS at 19:44

## 2021-04-14 RX ADMIN — METRONIDAZOLE SCH MLS: 500 INJECTION, SOLUTION INTRAVENOUS at 05:18

## 2021-04-14 RX ADMIN — POTASSIUM CHLORIDE SCH MLS: 2 INJECTION, SOLUTION, CONCENTRATE INTRAVENOUS at 15:12

## 2021-04-14 RX ADMIN — ONDANSETRON SCH: 2 INJECTION INTRAMUSCULAR; INTRAVENOUS at 14:56

## 2021-04-14 RX ADMIN — METRONIDAZOLE SCH MLS: 500 INJECTION, SOLUTION INTRAVENOUS at 23:55

## 2021-04-14 RX ADMIN — ONDANSETRON SCH: 2 INJECTION INTRAMUSCULAR; INTRAVENOUS at 20:53

## 2021-04-15 LAB
ANION GAP SERPL CALC-SCNC: 9 MMOL/L (ref 10–20)
BASOPHILS # BLD AUTO: 0 THOU/UL (ref 0–0.2)
BASOPHILS NFR BLD AUTO: 1.3 % (ref 0–1)
BUN SERPL-MCNC: 10 MG/DL (ref 9.8–20.1)
CALCIUM SERPL-MCNC: 8.1 MG/DL (ref 7.8–10.44)
CHLORIDE SERPL-SCNC: 108 MMOL/L (ref 98–107)
CO2 SERPL-SCNC: 27 MMOL/L (ref 23–31)
CREAT CL PREDICTED SERPL C-G-VRATE: 61 ML/MIN (ref 70–130)
EOSINOPHIL # BLD AUTO: 0 THOU/UL (ref 0–0.7)
EOSINOPHIL NFR BLD AUTO: 0.1 % (ref 0–10)
GLUCOSE SERPL-MCNC: 96 MG/DL (ref 83–110)
HGB BLD-MCNC: 9.3 G/DL (ref 12–16)
LYMPHOCYTES # BLD: 0.3 THOU/UL (ref 1.2–3.4)
LYMPHOCYTES NFR BLD AUTO: 21.5 % (ref 21–51)
MCH RBC QN AUTO: 32.7 PG (ref 27–31)
MCV RBC AUTO: 96.9 FL (ref 78–98)
MONOCYTES # BLD AUTO: 0.1 THOU/UL (ref 0.11–0.59)
MONOCYTES NFR BLD AUTO: 7.9 % (ref 0–10)
NEUTROPHILS # BLD AUTO: 1.1 THOU/UL (ref 1.4–6.5)
NEUTROPHILS NFR BLD AUTO: 69.1 % (ref 42–75)
PLATELET # BLD AUTO: 37 THOU/UL (ref 130–400)
POTASSIUM SERPL-SCNC: 3 MMOL/L (ref 3.5–5.1)
RBC # BLD AUTO: 2.85 MILL/UL (ref 4.2–5.4)
SODIUM SERPL-SCNC: 141 MMOL/L (ref 136–145)
WBC # BLD AUTO: 1.6 THOU/UL (ref 4.8–10.8)

## 2021-04-15 RX ADMIN — ONDANSETRON SCH: 2 INJECTION INTRAMUSCULAR; INTRAVENOUS at 21:03

## 2021-04-15 RX ADMIN — METRONIDAZOLE SCH MLS: 500 INJECTION, SOLUTION INTRAVENOUS at 17:51

## 2021-04-15 RX ADMIN — NYSTATIN SCH: 100000 SUSPENSION ORAL at 21:03

## 2021-04-15 RX ADMIN — ONDANSETRON SCH: 2 INJECTION INTRAMUSCULAR; INTRAVENOUS at 11:48

## 2021-04-15 RX ADMIN — METRONIDAZOLE SCH MLS: 500 INJECTION, SOLUTION INTRAVENOUS at 10:42

## 2021-04-15 RX ADMIN — NYSTATIN SCH: 100000 SUSPENSION ORAL at 16:41

## 2021-04-15 RX ADMIN — ONDANSETRON SCH: 2 INJECTION INTRAMUSCULAR; INTRAVENOUS at 04:20

## 2021-04-15 RX ADMIN — NYSTATIN SCH: 100000 SUSPENSION ORAL at 21:05

## 2021-04-16 LAB
ANION GAP SERPL CALC-SCNC: 11 MMOL/L (ref 10–20)
BASOPHILS # BLD AUTO: 0 THOU/UL (ref 0–0.2)
BASOPHILS NFR BLD AUTO: 0 % (ref 0–1)
BUN SERPL-MCNC: 9 MG/DL (ref 9.8–20.1)
CALCIUM SERPL-MCNC: 7.9 MG/DL (ref 7.8–10.44)
CHLORIDE SERPL-SCNC: 108 MMOL/L (ref 98–107)
CO2 SERPL-SCNC: 25 MMOL/L (ref 23–31)
CREAT CL PREDICTED SERPL C-G-VRATE: 57 ML/MIN (ref 70–130)
EOSINOPHIL # BLD AUTO: 0 THOU/UL (ref 0–0.7)
EOSINOPHIL NFR BLD AUTO: 0.1 % (ref 0–10)
GLUCOSE SERPL-MCNC: 85 MG/DL (ref 83–110)
HGB BLD-MCNC: 8.8 G/DL (ref 12–16)
LYMPHOCYTES # BLD: 0.6 THOU/UL (ref 1.2–3.4)
LYMPHOCYTES NFR BLD AUTO: 31.6 % (ref 21–51)
MCH RBC QN AUTO: 32.5 PG (ref 27–31)
MCV RBC AUTO: 96.8 FL (ref 78–98)
MONOCYTES # BLD AUTO: 0.1 THOU/UL (ref 0.11–0.59)
MONOCYTES NFR BLD AUTO: 7 % (ref 0–10)
NEUTROPHILS # BLD AUTO: 1.1 THOU/UL (ref 1.4–6.5)
NEUTROPHILS NFR BLD AUTO: 61.3 % (ref 42–75)
PLATELET # BLD AUTO: 22 THOU/UL (ref 130–400)
POTASSIUM SERPL-SCNC: 2.7 MMOL/L (ref 3.5–5.1)
RBC # BLD AUTO: 2.69 MILL/UL (ref 4.2–5.4)
SODIUM SERPL-SCNC: 141 MMOL/L (ref 136–145)
WBC # BLD AUTO: 1.8 THOU/UL (ref 4.8–10.8)

## 2021-04-16 RX ADMIN — METRONIDAZOLE SCH MLS: 500 INJECTION, SOLUTION INTRAVENOUS at 00:19

## 2021-04-16 RX ADMIN — NYSTATIN SCH: 100000 SUSPENSION ORAL at 09:03

## 2021-04-16 RX ADMIN — POTASSIUM CHLORIDE SCH MLS: 14.9 INJECTION, SOLUTION INTRAVENOUS at 18:32

## 2021-04-16 RX ADMIN — Medication SCH ML: at 09:02

## 2021-04-16 RX ADMIN — Medication SCH ML: at 20:28

## 2021-04-16 RX ADMIN — NYSTATIN SCH: 100000 SUSPENSION ORAL at 14:14

## 2021-04-16 RX ADMIN — METRONIDAZOLE SCH MLS: 500 INJECTION, SOLUTION INTRAVENOUS at 09:02

## 2021-04-16 RX ADMIN — POTASSIUM CHLORIDE SCH MLS: 14.9 INJECTION, SOLUTION INTRAVENOUS at 15:40

## 2021-04-17 LAB
ANION GAP SERPL CALC-SCNC: 13 MMOL/L (ref 10–20)
BASOPHILS # BLD AUTO: 0 THOU/UL (ref 0–0.2)
BASOPHILS NFR BLD AUTO: 0 % (ref 0–1)
BUN SERPL-MCNC: 8 MG/DL (ref 9.8–20.1)
CALCIUM SERPL-MCNC: 8.2 MG/DL (ref 7.8–10.44)
CHLORIDE SERPL-SCNC: 108 MMOL/L (ref 98–107)
CO2 SERPL-SCNC: 22 MMOL/L (ref 23–31)
CREAT CL PREDICTED SERPL C-G-VRATE: 56 ML/MIN (ref 70–130)
EOSINOPHIL # BLD AUTO: 0 THOU/UL (ref 0–0.7)
EOSINOPHIL NFR BLD AUTO: 0.4 % (ref 0–10)
GLUCOSE SERPL-MCNC: 97 MG/DL (ref 83–110)
HGB BLD-MCNC: 8.8 G/DL (ref 12–16)
LYMPHOCYTES # BLD: 0.5 THOU/UL (ref 1.2–3.4)
LYMPHOCYTES NFR BLD AUTO: 31.1 % (ref 21–51)
MAGNESIUM SERPL-MCNC: 1.9 MG/DL (ref 1.6–2.6)
MCH RBC QN AUTO: 32.2 PG (ref 27–31)
MCV RBC AUTO: 97.1 FL (ref 78–98)
MONOCYTES # BLD AUTO: 0.1 THOU/UL (ref 0.11–0.59)
MONOCYTES NFR BLD AUTO: 6.1 % (ref 0–10)
NEUTROPHILS # BLD AUTO: 1.1 THOU/UL (ref 1.4–6.5)
NEUTROPHILS NFR BLD AUTO: 62.4 % (ref 42–75)
PLATELET # BLD AUTO: 17 THOU/UL (ref 130–400)
POTASSIUM SERPL-SCNC: 3.1 MMOL/L (ref 3.5–5.1)
RBC # BLD AUTO: 2.72 MILL/UL (ref 4.2–5.4)
SODIUM SERPL-SCNC: 140 MMOL/L (ref 136–145)
WBC # BLD AUTO: 1.7 THOU/UL (ref 4.8–10.8)

## 2021-04-17 RX ADMIN — Medication SCH: at 20:53

## 2021-04-17 RX ADMIN — Medication SCH ML: at 09:41

## 2021-04-18 LAB
HGB BLD-MCNC: 7.6 G/DL (ref 12–16)
MCH RBC QN AUTO: 31.2 PG (ref 27–31)
MCV RBC AUTO: 96.7 FL (ref 78–98)
MDIFF COMPLETE?: YES
PLATELET # BLD AUTO: 14 THOU/UL (ref 130–400)
RBC # BLD AUTO: 2.44 MILL/UL (ref 4.2–5.4)
WBC # BLD AUTO: 1 THOU/UL (ref 4.8–10.8)

## 2021-04-18 PROCEDURE — 30233R1 TRANSFUSION OF NONAUTOLOGOUS PLATELETS INTO PERIPHERAL VEIN, PERCUTANEOUS APPROACH: ICD-10-PCS | Performed by: HOSPITALIST

## 2021-04-18 RX ADMIN — Medication SCH: at 10:36

## 2021-04-18 RX ADMIN — Medication SCH: at 20:59

## 2021-04-19 LAB
HGB BLD-MCNC: 8.5 G/DL (ref 12–16)
MCH RBC QN AUTO: 31.9 PG (ref 27–31)
MCV RBC AUTO: 97.4 FL (ref 78–98)
MDIFF COMPLETE?: YES
PLATELET # BLD AUTO: 71 THOU/UL (ref 130–400)
POLYCHROMASIA BLD QL SMEAR: (no result) (100X)
RBC # BLD AUTO: 2.68 MILL/UL (ref 4.2–5.4)
WBC # BLD AUTO: 0.8 THOU/UL (ref 4.8–10.8)

## 2021-04-19 RX ADMIN — Medication SCH: at 10:08

## 2021-04-19 RX ADMIN — Medication SCH: at 21:28

## 2021-04-20 VITALS — SYSTOLIC BLOOD PRESSURE: 130 MMHG | TEMPERATURE: 98.2 F | DIASTOLIC BLOOD PRESSURE: 72 MMHG

## 2021-04-20 LAB
BASOPHILS # BLD AUTO: 0 THOU/UL (ref 0–0.2)
BASOPHILS NFR BLD AUTO: 0 % (ref 0–1)
EOSINOPHIL # BLD AUTO: 0 THOU/UL (ref 0–0.7)
EOSINOPHIL NFR BLD AUTO: 1.5 % (ref 0–10)
HGB BLD-MCNC: 8.1 G/DL (ref 12–16)
LYMPHOCYTES # BLD: 0.3 THOU/UL (ref 1.2–3.4)
LYMPHOCYTES NFR BLD AUTO: 33 % (ref 21–51)
MCH RBC QN AUTO: 33 PG (ref 27–31)
MCV RBC AUTO: 96.4 FL (ref 78–98)
MONOCYTES # BLD AUTO: 0 THOU/UL (ref 0.11–0.59)
MONOCYTES NFR BLD AUTO: 1.5 % (ref 0–10)
NEUTROPHILS # BLD AUTO: 0.6 THOU/UL (ref 1.4–6.5)
NEUTROPHILS NFR BLD AUTO: 64.1 % (ref 42–75)
PLATELET # BLD AUTO: 56 THOU/UL (ref 130–400)
RBC # BLD AUTO: 2.44 MILL/UL (ref 4.2–5.4)
WBC # BLD AUTO: 0.9 THOU/UL (ref 4.8–10.8)

## 2021-04-20 RX ADMIN — Medication SCH: at 08:14
